# Patient Record
Sex: MALE | Race: WHITE | Employment: STUDENT | URBAN - METROPOLITAN AREA
[De-identification: names, ages, dates, MRNs, and addresses within clinical notes are randomized per-mention and may not be internally consistent; named-entity substitution may affect disease eponyms.]

---

## 2019-07-16 ENCOUNTER — HOSPITAL ENCOUNTER (OUTPATIENT)
Dept: PHYSICAL THERAPY | Age: 19
Discharge: HOME | End: 2019-07-16
Payer: COMMERCIAL

## 2019-07-16 PROCEDURE — 97110 THERAPEUTIC EXERCISES: CPT | Performed by: PHYSICAL THERAPIST

## 2019-07-16 PROCEDURE — 97162 PT EVAL MOD COMPLEX 30 MIN: CPT | Performed by: PHYSICAL THERAPIST

## 2019-07-16 NOTE — PROGRESS NOTES
[facility]  [facility] 2000  Primary: Greg Andre Rpn  Secondary: Herbert Lees [employer] Commercial [id number] [facility] @ [address]  [address] [place] [address]  [phone]   [phone]      OUTPATIENT PHYSICAL THERAPY: Daily Treatment Note  2019          ICD-10: Treatment Diagnosis: Stiffness of right hip, not elsewhere classified (M25.651)  Pain in right hip (M25.551)    TREATMENT PLAN:  Effective Dates: 2019 TO 2019 (60 days). Frequency/Duration: 2-3 times a week for 60 Days    GOALS: (Goals have been discussed and agreed upon with patient.)  Short-Term Functional Goals: Time Frame: 3 weeks  1. Pt will demonstratel PROM with R hip without c/o > 80-90% of L hip ROM. 2. Pt will be able to single leg stance RLE >30 seconds without significant unsteadiness or sway demonstrating improving balance. 3. Pt will be able to demonstrate good hip stability during single leg bridge x10 reps (no hip drop noted) noting improving functional strength. Discharge Goals: Time Frame: 6 weeks  1. Pt will report no c/o pain with his R hip with ADLs and ambulating around campus to/from his classes. 2. Pt will demonstrate full R hip ROM without c/o.  3. Pt will be able to perform 15 minutes of pool jogging without c/o to prepare pt to begin land jogging progression. 4. Pt will be able to demonstrate one minute (heel taps) without compensation BLE demonstrating sufficient R LE strength to begin jogging progression.        _________________________________________________________________________  Pre-treatment Symptoms/Complaints:  R hip stiffness and limited activity level due to post-surgical hip labral repair   Pain: Initial: 0/10 R hip stiffness   Post Session:  0/10   Medications Last Reviewed:  2019  Updated Objective Findings:  See evaluation note from today     TREATMENT:   Therapeutic Exercise: (SEE MINUTES BELOW):  Exercises per grid below to improve mobility, strength and balance. Required minimal visual, verbal and tactile cues to promote proper body alignment. Progressed resistance and range as indicated. Aquatic Therapy (SEE MINUTES BELOW): Aquatic treatment performed per flow grid for Decreased muscle strength, Decreased static/dynamic balance and reactive control, Decreased range of motion, Decreased activity endurance and Low impact and reduced weight bearing activity. Cues provided for technique. Manual Therapy (SEE MINUTES BELOW):  See below to increase R hip mobility  Modalities (SEE MINUTES BELOW): see below to decrease inflammation       Date: 7/16/19  Eval       Modalities:                                Manual Therapy:                                                        Aquatics Activities: next       GAIT (F/B/S/M)        SLR gait        Hamstring Curl gait         Rockettes        Toe/ Heel Raises        Squats        Hip 4 way        Lunges        Clamshells        Hip Circles        Deep well:  Bicycling        Deep well: Jumping jacks        Deep well: Scissoring        Hamstring stretch        Piriformis stretch                                Therapeutic Exercises: 10 minutes       Swiss ball Bridging 2x15       Quadruped hip extension x20        clamshells 2x15                               Therapeutic Activities:                                  HEP: Pt has a copy of protocol exercises and added clamshells to HEP today. Also instructed pt to begin elliptical for 5 minutes without any resistance    [employer] Portal  Treatment/Session Summary:    · Response to Treatment:  Pt needed mild cues to prevent increased lumbar lordosis with hip extension in quadruped. Pt has been compliant with current HEP once daily. Pt will begin aquatics next session and plan to progress protocol as tolerates.   · Communication/Consultation:  None today  · Equipment provided today:  None today  · Recommendations/Intent for next treatment session: Next visit will focus on initiating aquatic exercises. Plan to progress per protocol as tolerates.     Total Treatment Billable Duration:  25 minutes  PT Patient Time In/Time Out  Time In: 0800  Time Out: 0825  Carlyn Hamlin PT

## 2019-07-16 NOTE — THERAPY EVALUATION
Allen Penn  : 2000 [address] @ [address] [place].  [phone]   [phone]        OUTPATIENT PHYSICAL THERAPY:Initial Assessment 2019        ICD-10: Treatment Diagnosis: Stiffness of right hip, not elsewhere classified (M25.651)  Pain in right hip (M25.551)    Precautions/Allergies:   Patient has no allergy information on record. Fall Risk Score:    Ambulatory/Rehab Services H2 Model Falls Risk Assessment    Risk Factors:       (1)  Gender [Male] Ability to Rise from Chair:       (0)  Ability to rise in a single movement    Falls Prevention Plan:       No modifications necessary   Total: (5 or greater = High Risk): 1     [facility]. All Rights Reserved. [facility] Patent #[id number]. 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 Pt has been 50% weight bearing until last week (at 4 weeks post-op pt discharged crutches). Pt has functional ROM; however, his trunk and hip feel stiff as he has been limited with stretching and activity secondary to post-surgerical precautions. Pt has decreased balance with R single leg stance and also demonstrates decreased R hip strength/stability. Pt will benefit from PT to increase R hip mobility and strength per protocol to prepare pt to return to prior level of function.      PROBLEM LIST (Impacting functional limitations):  1. Decreased Strength  2. Decreased ADL/Functional Activities  3. Decreased Balance  4. Increased Pain  5. Decreased Activity Tolerance  6. Decreased Flexibility/Joint Mobility  7. Decreased [place] with Home Exercise Program INTERVENTIONS PLANNED:  1. Balance Exercise  2. Cold  3. Electrical Stimulation  4. Heat  5. Home Exercise Program (HEP)  6. Manual Therapy  7. Neuromuscular Re-education/Strengthening  8. Range of Motion (ROM)  9. Therapeutic Activites  10. Therapeutic Exercise/Strengthening  11. aquatics    TREATMENT PLAN:  Effective Dates: 7/16/2019 TO 9/14/2019 (60 days). Frequency/Duration: 2-3 times a week for 60 Days    GOALS: (Goals have been discussed and agreed upon with patient.)  Short-Term Functional Goals: Time Frame: 3 weeks  1. Pt will demonstratel PROM with R hip without c/o > 80-90% of L hip ROM. 2. Pt will be able to single leg stance RLE >30 seconds without significant unsteadiness or sway demonstrating improving balance. 3. Pt will be able to demonstrate good hip stability during single leg bridge x10 reps (no hip drop noted) noting improving functional strength. Discharge Goals: Time Frame: 6 weeks  1. Pt will report no c/o pain with his R hip with ADLs and ambulating around campus to/from his classes. 2. Pt will demonstrate full R hip ROM without c/o.  3. Pt will be able to perform 15 minutes of pool jogging without c/o to prepare pt to begin land jogging progression. 4. Pt will be able to demonstrate one minute (heel taps) without compensation BLE demonstrating sufficient R LE strength to begin jogging progression. Rehabilitation Potential For Stated Goals: Excellent  Regarding Dagoberto Maharaj's therapy, I certify that the treatment plan above will be carried out by a therapist or under their direction.   Thank you for this referral,  Jazmin Leblanc, PT       Referring Physician Signature: Maegan Hinojosa MD              Date HISTORY:   History of Present Injury/Illness (Reason for Referral):  Pt reports initially feeling R hip pain while weight lifting, performing squats in Dec 2018. 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 Pt reports continued R hip pain and eventually had an MRI earlier this year revealing labral tear. Pt underwent conservative treatment but continued to have chronic R hip pain when injections wore off. Pt had hip labral repair 6/12/19. Pt has been in PT prior to returning to summer school session at [employer] and is continuing PT transitioning from week 4 to 5 in the protocol. Pt has been biking to/from his classes around campus. Pt will be transferring to [employer]   Past Medical History/Comorbidities:   Mr. Jossy Rhoades  has no past medical history on file. Mr. Jossy Rhoades  has no past surgical history on file. 3 prior R knee surgeries (ACL and meniscus)  Social History/Living Environment:     lives on [employer] [place]  Prior Level of Function/Work/Activity:  Independent; [employer] lacrosse athlete  Current Medications:  No current outpatient medications on file. Date Last Reviewed:  7/16/2019   # of Personal Factors/Comorbidities that affect the Plan of Care: 1-2: MODERATE COMPLEXITY   EXAMINATION:   Observation/Orthostatic Postural Assessment:          Increase lumbar lordosis   Palpation:          No specific tenderness   ROM:    Hip AROM flexion past 100 °begins feeling some internal stiffness;  Extension in sidelying at least 10 ° without c/o and ER and IR no c/o but did not fully test ROM; all other LEs WNL but noted bilateral hamstring tightness    SLR L 50 °  R 55 °     L hip and LEs WNL     Strength:     Date:  7/16/19 Date:   Date:     LE MMT Left Right Left Right Left Right   Hip Flex (L1/L2) 5/5 4/5       Hip Abd (glut med) 5/5 4+/5 but note hip drop during single leg bridge noting glute weakness with RLE       Hip Ext Quad    ( L3/4) 5/5 5/5       Hamstring 5/5 5/5       Anterior Tib (L4/L5) 5/5 5/5       Gastroc (S1/2)         EHL (L5)                             Special Tests:  Trendelenburg noted minor hip drop during R stance  Neurological Screen:        Sensation: intact and denies numbness/tingling  Functional Mobility:         Gait/Ambulation:  Independent WNL        Transfers:  independent        Stairs:  Reports independent   Balance:          SLS very unsteady with RLE; LLE >30 seconds and steady with SLS   Body Structures Involved:  1. Bones  2. Joints  3. Muscles Body Functions Affected:  1. Sensory/Pain  2. Neuromusculoskeletal  3. Movement Related Activities and Participation Affected:  1. Mobility  2. Community, Social and [place]  3. athletics and pt is student athlete   # of elements that affect the Plan of Care: 3: MODERATE COMPLEXITY   CLINICAL PRESENTATION:   Presentation: Stable and uncomplicated: LOW COMPLEXITY   CLINICAL DECISION MAKING:   Tool Used: Lower Extremity Functional Scale (LEFS)  Score:  Initial: 53/80 Most Recent: X/80 (Date: -- )   Interpretation of Score: 20 questions each scored on a 5 point scale with 0 representing \"extreme difficulty or unable to perform\" and 4 representing \"no difficulty\". The lower the score, the greater the functional disability. 80/80 represents no disability. Minimal detectable change is 9 points. Medical Necessity:   · Patient is expected to demonstrate progress in strength, range of motion and balance to return towards prior to activity level. Reason for Services/Other Comments:  · Patient continues to require modification of therapeutic interventions to increase complexity of exercises.    Use of outcome tool(s) and clinical judgement create a POC that gives a: Questionable prediction of patient's progress: MODERATE COMPLEXITY       Total Treatment Duration: (pt had to leave PT early to attend 8:30 class)  PT Patient Time In/Time Out  Time In: 0800  Time Out: 2465     Jazmin Leblanc, PT    .

## 2019-07-18 ENCOUNTER — HOSPITAL ENCOUNTER (OUTPATIENT)
Dept: PHYSICAL THERAPY | Age: 19
Discharge: HOME | End: 2019-07-18
Payer: COMMERCIAL

## 2019-07-18 PROCEDURE — 97110 THERAPEUTIC EXERCISES: CPT

## 2019-07-18 NOTE — PROGRESS NOTES
Mimi Garcia  : 2000  Primary: Arnetha Sprain Rpn  Secondary: Alejandra Mendiola [employer] [address] @ [address] [place].  [phone]   [phone]      OUTPATIENT PHYSICAL THERAPY: Daily Treatment Note  2019          ICD-10: Treatment Diagnosis: Stiffness of right hip, not elsewhere classified (M25.651)  Pain in right hip (M25.551)    TREATMENT PLAN:  Effective Dates: 2019 TO 2019 (60 days). Frequency/Duration: 2-3 times a week for 60 Days    GOALS: (Goals have been discussed and agreed upon with patient.)  Short-Term Functional Goals: Time Frame: 3 weeks  1. Pt will demonstratel PROM with R hip without c/o > 80-90% of L hip ROM. 2. Pt will be able to single leg stance RLE >30 seconds without significant unsteadiness or sway demonstrating improving balance. 3. Pt will be able to demonstrate good hip stability during single leg bridge x10 reps (no hip drop noted) noting improving functional strength. Discharge Goals: Time Frame: 6 weeks  1. Pt will report no c/o pain with his R hip with ADLs and ambulating around campus to/from his classes. 2. Pt will demonstrate full R hip ROM without c/o.  3. Pt will be able to perform 15 minutes of pool jogging without c/o to prepare pt to begin land jogging progression. 4. Pt will be able to demonstrate one minute (heel taps) without compensation BLE demonstrating sufficient R LE strength to begin jogging progression. _________________________________________________________________________  Pre-treatment Symptoms/Complaints:  Pt reports mild R hip stiffness and limited activity level due to post-surgical hip labral repair. He reports compliance HEP, and has a copy of MD protocol.        Pain: Initial: 0/10 R hip stiffness       Post Session:  0/10   Medications Last Reviewed:  2019  Updated Objective Findings:  No changes noted from evaluation on 19     TREATMENT: Therapeutic Exercise: (SEE MINUTES BELOW):  Exercises per grid below to improve mobility, strength and balance. Required minimal visual, verbal and tactile cues to promote proper body alignment. Progressed resistance and range as indicated. Aquatic Therapy (SEE MINUTES BELOW): Aquatic treatment performed per flow grid for Decreased muscle strength, Decreased static/dynamic balance and reactive control, Decreased range of motion, Decreased activity endurance and Low impact and reduced weight bearing activity. Cues provided for technique.      Manual Therapy (SEE MINUTES BELOW):  See below to increase R hip mobility  Modalities (SEE MINUTES BELOW): see below to decrease inflammation       Date: 7/16/19  Eval 7/18/19  Visit 2      Modalities:                                Manual Therapy:  5 mins      Scar massage  5 mins                                              Aquatics Activities: next No aquatics / lightening      GAIT (F/B/S/M)        SLR gait        Hamstring Curl gait         Rockettes        Toe/ Heel Raises        Squats        Hip 4 way        Lunges        Clamshells        Hip Circles        Deep well:  Bicycling        Deep well: Jumping jacks        Deep well: Scissoring        Hamstring stretch        Piriformis stretch                                Therapeutic Exercises: 10 mins 45 mins      Stationary bike  5 min warm up      Swiss ball Bridging 2x15 x10  x10 c hip add ball sqz  x10 c hip abd grn tb      Quadruped hip extension x20        clamshells 2x15 grn tb 2x10 B      Wall squats with swiss ball  2x10      Quadraped: alternating hip extn with shdlr flexion  x10 B      VMO step down  4\" 2x10 B      Bosu step up  2x10 B      Step up with contralateral high knee  8\" 2x10 B      3-way calf raises  x15 ea      slantboard stretch  1 min      LAQ   5# 2x10 B      Supine HSC   Swiss ball 2x10                                              Long axis   2xH 1min      Supine adductor stretch \"butterfly\"                Therapeutic Activities:                                  HEP: Pt has a copy of protocol exercises and added clamshells to HEP today. Also instructed pt to begin elliptical for 5 minutes without any resistance    [employer] Portal  Treatment/Session Summary:    · Response to Treatment:  Pt was instructed with and performed Therapeutic Exercise per flow above. Verbal cuing to above hip pain or discomfort. Pt demonstrates good technique throughout. Weakness noted with R>L quads. · Communication/Consultation:  None today  · Equipment provided today:  None today  Recommendations/Intent for next treatment session:  Plan to alternate days with aquatics and Therapeutic exercise over the next 4 weeks, before patient moves from [place].      Total Treatment Billable Duration:  50minutes  PT Patient Time In/Time Out  Time In: 1600  Time Out: 75 Mera Pate, SIMA

## 2019-07-19 ENCOUNTER — HOSPITAL ENCOUNTER (OUTPATIENT)
Dept: PHYSICAL THERAPY | Age: 19
Discharge: HOME | End: 2019-07-19
Payer: COMMERCIAL

## 2019-07-19 PROCEDURE — 97113 AQUATIC THERAPY/EXERCISES: CPT

## 2019-07-19 NOTE — PROGRESS NOTES
Anne Bonillaamp  : 2000  Primary: Karla Gresham Rpn  Secondary: Columba Byers [employer] [address] [place] [address] @ [place] [address].  [phone]   [phone]      OUTPATIENT PHYSICAL THERAPY: Daily Treatment Note  2019          ICD-10: Treatment Diagnosis: Stiffness of right hip, not elsewhere classified (M25.651)  Pain in right hip (M25.551)    TREATMENT PLAN:  Effective Dates: 2019 TO 2019 (60 days). Frequency/Duration: 2-3 times a week for 60 Days    GOALS: (Goals have been discussed and agreed upon with patient.)  Short-Term Functional Goals: Time Frame: 3 weeks  1. Pt will demonstratel PROM with R hip without c/o > 80-90% of L hip ROM. 2. Pt will be able to single leg stance RLE >30 seconds without significant unsteadiness or sway demonstrating improving balance. 3. Pt will be able to demonstrate good hip stability during single leg bridge x10 reps (no hip drop noted) noting improving functional strength. Discharge Goals: Time Frame: 6 weeks  1. Pt will report no c/o pain with his R hip with ADLs and ambulating around campus to/from his classes. 2. Pt will demonstrate full R hip ROM without c/o.  3. Pt will be able to perform 15 minutes of pool jogging without c/o to prepare pt to begin land jogging progression. 4. Pt will be able to demonstrate one minute (heel taps) without compensation BLE demonstrating sufficient R LE strength to begin jogging progression. _________________________________________________________________________  Pre-treatment Symptoms/Complaints:  Pt reports mild R hip stiffness. He continues to limit activity level due to post-surgical hip labral repair. He reports compliance HEP, and has a copy of MD protocol.        Pain: Initial: 0/10 R hip pain, very mild stiffness noted       Post Session:  0/10   Medications Last Reviewed:  2019  Updated Objective Findings:  No changes noted from evaluation on 7/16/19     TREATMENT:   Therapeutic Exercise: (SEE MINUTES BELOW):  Exercises per grid below to improve mobility, strength and balance. Required minimal visual, verbal and tactile cues to promote proper body alignment. Progressed resistance and range as indicated. Aquatic Therapy (SEE MINUTES BELOW): Aquatic treatment performed per flow grid for Decreased muscle strength, Decreased static/dynamic balance and reactive control, Decreased range of motion, Decreased activity endurance and Low impact and reduced weight bearing activity. Cues provided for technique.      Manual Therapy (SEE MINUTES BELOW):  See below to increase R hip mobility  Modalities (SEE MINUTES BELOW): see below to decrease inflammation       Date: 7/16/19  Eval 7/18/19  Visit 2 7/19/19  Visit 3     Modalities:                                Manual Therapy:  5 mins      Scar massage  5 mins                                              Aquatics Activities: next No aquatics / lightening  55 mins  2.5#     GAIT (F/B/S/M)   X6l F/B/S     Side step with squat using dumbbe;lls   x4L     SLR gait        Hamstring Curl gait         Rockettes        Toe/ Heel Raises        Squats        Hip 4 way   x20 B     Lunges        Clamshells        Hip Circles   X10cw/ccw B     SLS with ball toss   1 min R             Core: dumbbells BUE abd/add in squat stance   x20     Core: dumbbells BUE flex/extn in squat stance   x20     Core: paddles BUE horz abd/add in squat stance   x20     Core: dumbbells BUE \"rows\" in squat stance   x20     Core: paddles BUE alternating flex/extn in staggered stance   x20             Plank: alternating hip abd  Holding 2 kickboards   1 min     Plank alternating hip extn   1 min     Flutter kick (lap pool)        Step ups                 Deep well:  Bicycling   5 min pre  5 min post     Deep well: Jumping jacks   1 min     Deep well: Scissoring   1 min     Hamstring stretch        Piriformis stretch        Side lunge stretch                        Therapeutic Exercises: 10 mins 45 mins    mins    Stationary bike  5 min warm up      Swiss ball Bridging 2x15 x10  x10 c hip add ball sqz  x10 c hip abd grn tb      Quadruped hip extension x20        clamshells 2x15 grn tb 2x10 B      Wall squats with swiss ball  2x10      Quadraped: alternating hip extn with shdlr flexion  x10 B      VMO step down  4\" 2x10 B      Bosu step up  2x10 B      Step up with contralateral high knee  8\" 2x10 B      3-way calf raises  x15 ea      slantboard stretch  1 min      LAQ   5# 2x10 B      Supine HSC   Swiss ball 2x10                                              Long axis   2xH 1min      Supine adductor stretch \"butterfly\"                Therapeutic Activities:                                  HEP: Pt has a copy of protocol exercises and added clamshells to HEP today. Also instructed pt to begin elliptical for 5 minutes without any resistance    [facility]  Treatment/Session Summary:    · Response to Treatment:  With today's treatment, pt was instructed with and performed an aquatic therapy program per flow sheet above. Pt utilized approx 50-60% buoyancy, aqua jogging in off load (deep water). Verbal cuing to above hip pain or discomfort. Pt demonstrates good technique throughout. · Communication/Consultation:  None today  · Equipment provided today:  None today  Recommendations/Intent for next treatment session:  Plan to alternate days with aquatics and Therapeutic exercise over the next 4 weeks, before patient moves from [place].      Total Treatment Billable Duration:  55 mins  PT Patient Time In/Time Out  Time In: 1500  Time Out: [address], PTA

## 2019-07-22 ENCOUNTER — HOSPITAL ENCOUNTER (OUTPATIENT)
Dept: PHYSICAL THERAPY | Age: 19
Discharge: HOME | End: 2019-07-22
Payer: COMMERCIAL

## 2019-07-22 PROCEDURE — 97110 THERAPEUTIC EXERCISES: CPT

## 2019-07-22 NOTE — PROGRESS NOTES
Lesa Jacinto  : 2000  Primary: Mone Casas Rpn  Secondary: Quince Ruffing [employer] [address] [place] [address] @ [place] [address].  [phone]   [phone]      OUTPATIENT PHYSICAL THERAPY: Daily Treatment Note  2019          ICD-10: Treatment Diagnosis: Stiffness of right hip, not elsewhere classified (M25.651)  Pain in right hip (M25.551)    TREATMENT PLAN:  Effective Dates: 2019 TO 2019 (60 days). Frequency/Duration: 2-3 times a week for 60 Days    GOALS: (Goals have been discussed and agreed upon with patient.)  Short-Term Functional Goals: Time Frame: 3 weeks  1. Pt will demonstratel PROM with R hip without c/o > 80-90% of L hip ROM. 2. Pt will be able to single leg stance RLE >30 seconds without significant unsteadiness or sway demonstrating improving balance. 3. Pt will be able to demonstrate good hip stability during single leg bridge x10 reps (no hip drop noted) noting improving functional strength. Discharge Goals: Time Frame: 6 weeks  1. Pt will report no c/o pain with his R hip with ADLs and ambulating around campus to/from his classes. 2. Pt will demonstrate full R hip ROM without c/o.  3. Pt will be able to perform 15 minutes of pool jogging without c/o to prepare pt to begin land jogging progression. 4. Pt will be able to demonstrate one minute (heel taps) without compensation BLE demonstrating sufficient R LE strength to begin jogging progression. _________________________________________________________________________  Pre-treatment Symptoms/Complaints:  Pt with no new complaints. Says overall R hip \"feels good\". He notes mild R hip stiffness. He continues to limit activity level due to post-surgical hip labral repair. He reports compliance HEP, and has a copy of MD protocol.        Pain: Initial: 0/10 R hip pain, very mild stiffness noted       Post Session:  0/10   Medications Last Reviewed: 7/22/2019  Updated Objective Findings:  No changes noted from evaluation on 7/16/19     TREATMENT:   Therapeutic Exercise: (SEE MINUTES BELOW):  Exercises per grid below to improve mobility, strength and balance. Required minimal visual, verbal and tactile cues to promote proper body alignment. Progressed resistance and range as indicated. Aquatic Therapy (SEE MINUTES BELOW): Aquatic treatment performed per flow grid for Decreased muscle strength, Decreased static/dynamic balance and reactive control, Decreased range of motion, Decreased activity endurance and Low impact and reduced weight bearing activity. Cues provided for technique.      Manual Therapy (SEE MINUTES BELOW):  See below to increase R hip mobility  Modalities (SEE MINUTES BELOW): see below to decrease inflammation       Date: 7/16/19  Eval 7/18/19  Visit 2 7/19/19  Visit 3 7/22/19  Visit 4    Modalities:                                Manual Therapy:  5 mins      Scar massage  5 mins      Long axis distraction        Inferior glides; supine hip at 90         PROM: 105 deg flex if no pain  20 deg ER if no pain  Week 6 full ROM as tolerated                        Aquatics Activities: next No aquatics / lightening  55 mins  2.5#     GAIT (F/B/S/M)   X6l F/B/S     Side step with squat using dumbbe;lls   x4L     SLR gait        Hamstring Curl gait         Rockettes        Toe/ Heel Raises        Squats        Hip 4 way   x20 B     Lunges        Clamshells        Hip Circles   X10cw/ccw B     SLS with ball toss   1 min R             Core: dumbbells BUE abd/add in squat stance   x20     Core: dumbbells BUE flex/extn in squat stance   x20     Core: paddles BUE horz abd/add in squat stance   x20     Core: dumbbells BUE \"rows\" in squat stance   x20     Core: paddles BUE alternating flex/extn in staggered stance   x20             Plank: alternating hip abd  Holding 2 kickboards   1 min     Plank alternating hip extn   1 min     Flutter kick (lap pool) Step ups                 Deep well:  Bicycling   5 min pre  5 min post     Deep well: Jumping jacks   1 min     Deep well: Scissoring   1 min     Hamstring stretch        Piriformis stretch        Side lunge stretch                        Therapeutic Exercises: 10 mins 45 mins   55 mins    Stationary bike  5 min warm up  Pt on elliptical prior    Swiss ball Bridging 2x15 x10  x10 c hip add ball sqz  x10 c hip abd grn tb  x10  x10 with HSC    Quadruped hip extension x20    x10    clamshells 2x15 grn tb 2x10 B  grn tb 2x10B    Wall squats with swiss ball  2x10  2X10    Quadraped: alternating hip extn with shdlr flexion  x10 B    x10 UE/LE    VMO step down  4\" 2x10 B  Hold 2day    Bosu step up  2x10 B  2x10B    Step up with contralateral high knee  8\" 2x10 B      3-way calf raises  x15 ea  x15 ea    slantboard stretch  1 min   1 min    LAQ   5# 2x10 B  5#2x10B    Side stepping - hip abd    Blue loop    \"monster walk\"    Blue loop    Supine HSC   Swiss ball 2x10  x10    Swiss ball: walk out bridge    X10    Swiss ball: walk out bridge with alternating hip/knee extn    X10  Close SBA    Swiss ball: walk out plank    Can add push up    Swiss ball: supine HSC    x10    Swiss ball: supine hips at 90 deg, holding med ball between ankles BLE IR/ER                                        Long axis   2xH 1min  2x1 min    Supine adductor stretch \"butterfly\"    x10 H10    Gentle inferior glides        Therapeutic Activities:                                  HEP: Pt has a copy of protocol exercises and added clamshells to HEP today. Also instructed pt to begin elliptical for 5 minutes without any resistance    [facility] Portal  Treatment/Session Summary:    · Response to Treatment:  With today's treatment, pt continued with Therapeutic Exercise program, per flow sheet above. Exercises perform for hip and LE strengthening. Verbal cuing provided to avoid painful episodes. Pt demonstrates weakness R quads >R hip mm. · Communication/Consultation:  None today  · Equipment provided today:  None today  Recommendations/Intent for next treatment session:  Plan to alternate days with aquatics and Therapeutic exercise over the next 4 weeks, before patient moves from [place] on Aug 15th    Total Treatment Billable Duration:  55 mins  PT Patient Time In/Time Out  Time In: 1500  Time Out: [address], PTA

## 2019-07-23 ENCOUNTER — HOSPITAL ENCOUNTER (OUTPATIENT)
Dept: PHYSICAL THERAPY | Age: 19
Discharge: HOME | End: 2019-07-23
Payer: COMMERCIAL

## 2019-07-23 PROCEDURE — 97113 AQUATIC THERAPY/EXERCISES: CPT

## 2019-07-23 NOTE — PROGRESS NOTES
Asha Freire  : 2000  Primary: Mali Kasper Rpn  Secondary: Drew Oh [employer] [address] @ [address], [address] [place] [address]  [phone]   [phone]      OUTPATIENT PHYSICAL THERAPY: Daily Treatment Note  2019          ICD-10: Treatment Diagnosis: Stiffness of right hip, not elsewhere classified (M25.651)  Pain in right hip (M25.551)    TREATMENT PLAN:  Effective Dates: 2019 TO 2019 (60 days). Frequency/Duration: 2-3 times a week for 60 Days    GOALS: (Goals have been discussed and agreed upon with patient.)  Short-Term Functional Goals: Time Frame: 3 weeks  1. Pt will demonstratel PROM with R hip without c/o > 80-90% of L hip ROM. 2. Pt will be able to single leg stance RLE >30 seconds without significant unsteadiness or sway demonstrating improving balance. 3. Pt will be able to demonstrate good hip stability during single leg bridge x10 reps (no hip drop noted) noting improving functional strength. Discharge Goals: Time Frame: 6 weeks  1. Pt will report no c/o pain with his R hip with ADLs and ambulating around campus to/from his classes. 2. Pt will demonstrate full R hip ROM without c/o.  3. Pt will be able to perform 15 minutes of pool jogging without c/o to prepare pt to begin land jogging progression. 4. Pt will be able to demonstrate one minute (heel taps) without compensation BLE demonstrating sufficient R LE strength to begin jogging progression. _________________________________________________________________________  Pre-treatment Symptoms/Complaints:  Pt with no new complaints. Says when he lightly stretches into flexion, feels tight for 3-4 reps and then \"lets go\". Primary complaint of mild R hip stiffness. He continues to limit activity level due to post-surgical hip labral repair. He reports compliance HEP, and has a copy of MD protocol.        Pain: Initial: 0/10 R hip pain, very mild stiffness noted. He denies painful episodes       Post Session:  0/10   Medications Last Reviewed:  7/23/2019  Updated Objective Findings:  No changes noted from evaluation on 7/16/19     TREATMENT:   Therapeutic Exercise: (SEE MINUTES BELOW):  Exercises per grid below to improve mobility, strength and balance. Required minimal visual, verbal and tactile cues to promote proper body alignment. Progressed resistance and range as indicated. Aquatic Therapy (SEE MINUTES BELOW): Aquatic treatment performed per flow grid for Decreased muscle strength, Decreased static/dynamic balance and reactive control, Decreased range of motion, Decreased activity endurance and Low impact and reduced weight bearing activity. Cues provided for technique.      Manual Therapy (SEE MINUTES BELOW):  See below to increase R hip mobility  Modalities (SEE MINUTES BELOW): see below to decrease inflammation       Date: 7/16/19  Eval 7/18/19  Visit 2 7/19/19  Visit 3 7/22/19  Visit 4 7/23/19  Visit 5   Modalities:                                Manual Therapy:  5 mins      Scar massage  5 mins      Long axis distraction        Inferior glides; supine hip at 90         PROM: 105 deg flex if no pain  20 deg ER if no pain  Week 6 full ROM as tolerated                        Aquatics Activities: next No aquatics / lightening  55 mins  2.5#  55 mins  3.73#   GAIT (F/B/S/M)   X6l F/B/S  c dumbbells F/B/S   Side step with squat using dumbbe;lls   x4L  x4L   SLR gait        Hamstring Curl gait      x2L for quad/hip flexor stretch   Rockettes        Toe/ Heel Raises        Squats     x20 on bottom step   Hip 4 way   x20 B     Lunges        Clamshells        Hip Circles   X10cw/ccw B  x15 cw/ccw B   Wall sit  (3 ft) with alternating LAQ     X30B   SLS with ball toss   1 min R             Core: dumbbells BUE abd/add in squat stance   x20  X30 purplle db   Core: dumbbells BUE flex/extn in squat stance   x20  x30    Core: paddles BUE horz abd/add in squat stance   x20  x30   Core: dumbbells BUE \"rows\" in squat stance   x20  x30   Core: paddles BUE alternating flex/extn in staggered stance   x20  c dumbbells x15B           Plank: alternating hip abd  Holding 2 kickboards   1 min  x30 B   Plank alternating hip extn   1 min  x30B   Flutter kick (lap pool)     X6L flippers 6 laps   1 lap \"frog kick\"   Step ups                 Deep well:  Bicycling   5 min pre  5 min post  Aqua jog 5 min   Deep well: Jumping jacks   1 min  90 sec   Deep well: Scissoring   1 min  90 sec   Hamstring stretch        Piriformis stretch        Side lunge stretch                        Therapeutic Exercises: 10 mins 45 mins   55 mins    Stationary bike  5 min warm up  Pt on elliptical prior    Swiss ball Bridging 2x15 x10  x10 c hip add ball sqz  x10 c hip abd grn tb  x10  x10 with HSC    Quadruped hip extension x20    x10    clamshells 2x15 grn tb 2x10 B  grn tb 2x10B    Wall squats with swiss ball  2x10  2X10    Quadraped: alternating hip extn with shdlr flexion  x10 B    x10 UE/LE    VMO step down  4\" 2x10 B  Hold 2day    Bosu step up  2x10 B  2x10B    Step up with contralateral high knee  8\" 2x10 B      3-way calf raises  x15 ea  x15 ea    slantboard stretch  1 min   1 min    LAQ   5# 2x10 B  5#2x10B    Side stepping - hip abd    Blue loop    \"monster walk\"    Blue loop    Supine HSC   Swiss ball 2x10  x10    Swiss ball: walk out bridge    X10    Swiss ball: walk out bridge with alternating hip/knee extn    X10  Close SBA    Swiss ball: walk out plank    Can add push up    Swiss ball: supine HSC    x10    Swiss ball: supine hips at 90 deg, holding med ball between ankles BLE IR/ER         with                                 Long axis   2xH 1min  2x1 min    Supine adductor stretch \"butterfly\"    x10 H10    Gentle inferior glides        Therapeutic Activities:                                  HEP: Pt has a copy of protocol exercises for HEP    [facility] Portal  Treatment/Session Summary: · Response to Treatment:  With today's treatment, pt continued with aquatic exercises per flow sheet above, adding flutter kicks as noted. Pt notes mild tightness with hip extension past neutral, with sensation resolving after several seconds, or repetitions. Exercises perform for hip and LE strengthening and ROM. Verbal cuing provided to avoid painful episodes. Pt demonstrates weakness R quads >R hip mm.      · Communication/Consultation:  None today  · Equipment provided today:  None today  Recommendations/Intent for next treatment session:  Plan to alternate days with aquatics and Therapeutic exercise over the next 4 weeks, before patient moves from [place] on Aug 15th    Total Treatment Billable Duration:  55 mins  PT Patient Time In/Time Out  Time In: 1500  Time Out: [address], PTA

## 2019-07-31 ENCOUNTER — HOSPITAL ENCOUNTER (OUTPATIENT)
Dept: PHYSICAL THERAPY | Age: 19
Discharge: HOME | End: 2019-07-31
Payer: COMMERCIAL

## 2019-07-31 NOTE — PROGRESS NOTES
Patient called to cancel today's physical therapy appointment, due to a conflict with the time. Will continue with POC at next visit.

## 2019-08-05 ENCOUNTER — HOSPITAL ENCOUNTER (OUTPATIENT)
Dept: PHYSICAL THERAPY | Age: 19
Discharge: HOME | End: 2019-08-05
Payer: COMMERCIAL

## 2019-08-05 PROCEDURE — 97113 AQUATIC THERAPY/EXERCISES: CPT

## 2019-08-05 NOTE — PROGRESS NOTES
Alexsandra Hew  : 2000  Primary: Renu Farmer Rpn  Secondary: Raffy Chavarria [employer] [address] [place] [address] @ [address], [address] [place] [address]  [phone]   [phone]      OUTPATIENT PHYSICAL THERAPY: Daily Treatment Note  2019          ICD-10: Treatment Diagnosis: Stiffness of right hip, not elsewhere classified (M25.651)  Pain in right hip (M25.551)    TREATMENT PLAN:  Effective Dates: 2019 TO 2019 (60 days). Frequency/Duration: 2-3 times a week for 60 Days    GOALS: (Goals have been discussed and agreed upon with patient.)  Short-Term Functional Goals: Time Frame: 3 weeks  1. Pt will demonstratel PROM with R hip without c/o > 80-90% of L hip ROM. 2. Pt will be able to single leg stance RLE >30 seconds without significant unsteadiness or sway demonstrating improving balance. 3. Pt will be able to demonstrate good hip stability during single leg bridge x10 reps (no hip drop noted) noting improving functional strength. Discharge Goals: Time Frame: 6 weeks  1. Pt will report no c/o pain with his R hip with ADLs and ambulating around campus to/from his classes. 2. Pt will demonstrate full R hip ROM without c/o.  3. Pt will be able to perform 15 minutes of pool jogging without c/o to prepare pt to begin land jogging progression. 4. Pt will be able to demonstrate one minute (heel taps) without compensation BLE demonstrating sufficient R LE strength to begin jogging progression. _________________________________________________________________________  Pre-treatment Symptoms/Complaints:  Pt with no new complaints. Says when he lightly stretches into flexion, feels tight for 3-4 reps and then \"lets go\". Primary complaint of mild R hip stiffness. He continues to limit activity level due to post-surgical hip labral repair. He reports compliance HEP, and has a copy of MD protocol.        Pain: Initial: 0/10   Pt reports no pain but states he is having R hip flexor tightness       Post Session:  0/10   Medications Last Reviewed:  8/5/2019  Updated Objective Findings:  No changes noted from evaluation on 7/16/19     TREATMENT:   Therapeutic Exercise: (SEE MINUTES BELOW):  Exercises per grid below to improve mobility, strength and balance. Required minimal visual, verbal and tactile cues to promote proper body alignment. Progressed resistance and range as indicated. Aquatic Therapy (SEE MINUTES BELOW): Aquatic treatment performed per flow grid for Decreased muscle strength, Decreased static/dynamic balance and reactive control, Decreased range of motion, Decreased activity endurance and Low impact and reduced weight bearing activity. Cues provided for technique. Manual Therapy (SEE MINUTES BELOW):  See below to increase R hip mobility  Modalities (SEE MINUTES BELOW): see below to decrease inflammation       Date: 7/16/19  Eval 7/18/19  Visit 2 7/19/19  Visit 3 7/22/19  Visit 4 7/23/19  Visit 5 8/5/19   visit 6   Modalities:                                    Manual Therapy:  5 mins       Scar massage  5 mins       Long axis distraction         Inferior glides; supine hip at 90          PROM: 105 deg flex if no pain  20 deg ER if no pain  Week 6 full ROM as tolerated                           Aquatics Activities: next No aquatics / lightening  55 mins  2.5#  55 mins  3.73# 33. 75# 45 min   GAIT (F/B/S/M)   X6l F/B/S  c dumbbells F/B/S x4L DB   Side step with squat using dumbbe;lls   x4L  x4L x4L DB   SLR gait         Hamstring Curl gait      x2L for quad/hip flexor stretch    Rockettes         Toe/ Heel Raises         Squats     x20 on bottom step    Hip 4 way   x20 B   x10 slow x10 fast   Lunges         Clamshells         Hip Circles   X10cw/ccw B  x15 cw/ccw B    Wall sit  (3 ft) with alternating LAQ     X30B    SLS with ball toss   1 min R      sportscord      Lunges F/S x15 B   Core: dumbbells BUE abd/add in squat stance   x20  X30 purplle db    Core: dumbbells BUE flex/extn in squat stance   x20  x30     Core: paddles BUE horz abd/add in squat stance   x20  x30    Core: dumbbells BUE \"rows\" in squat stance   x20  x30    Core: paddles BUE alternating flex/extn in staggered stance   x20  c dumbbells x15B             Plank: alternating hip abd  Holding 2 kickboards   1 min  x30 B    Plank alternating hip extn   1 min  x30B    Flutter kick (lap pool)     X6L flippers 6 laps   1 lap \"frog kick\" x6L flippers, 1 lap frog kick   Step ups                   Deep well:  Bicycling   5 min pre  5 min post  Aqua jog 5 min 6 min interval sprints (1 lap sprint, 1 lap recovery)   Deep well: Jumping jacks   1 min  90 sec    Deep well: Scissoring   1 min  90 sec 2 min   Hamstring stretch         Piriformis stretch         Side lunge stretch         Plank c hip extension      2x15 B            Therapeutic Exercises: 10 mins 45 mins   55 mins     Stationary bike  5 min warm up  Pt on elliptical prior     Swiss ball Bridging 2x15 x10  x10 c hip add ball sqz  x10 c hip abd grn tb  x10  x10 with HSC     Quadruped hip extension x20    x10     clamshells 2x15 grn tb 2x10 B  grn tb 2x10B     Wall squats with swiss ball  2x10  2X10     Quadraped: alternating hip extn with shdlr flexion  x10 B    x10 UE/LE     VMO step down  4\" 2x10 B  Hold 2day     Bosu step up  2x10 B  2x10B     Step up with contralateral high knee  8\" 2x10 B       3-way calf raises  x15 ea  x15 ea     slantboard stretch  1 min   1 min     LAQ   5# 2x10 B  5#2x10B     Side stepping - hip abd    Blue loop     \"monster walk\"    Blue loop     Supine HSC   Swiss ball 2x10  x10     Swiss ball: walk out bridge    X10     Swiss ball: walk out bridge with alternating hip/knee extn    X10  Close SBA     Swiss ball: walk out plank    Can add push up     Swiss ball: supine HSC    x10     Swiss ball: supine hips at 90 deg, holding med ball between ankles BLE IR/ER          with Long axis   2xH 1min  2x1 min     Supine adductor stretch \"butterfly\"    x10 H10     Gentle inferior glides         Therapeutic Activities:                                      HEP: Pt has a copy of protocol exercises for HEP    [facility] Portal  Treatment/Session Summary:    Response to Treatment:  Pt requested aquatics today as he feels he is more challenged. Continued as seen above adding sports cord exercises. Pt demonstrates decreased balance/stability with higher level resistance. Decreased endurance with deep well intervals. Pt denies any painful episodes during treatment. Pt approached therapy with decreased motivation today and appeared apathetic.   · Communication/Consultation:  None today  · Equipment provided today:  None today  Recommendations/Intent for next treatment session:  Plan to alternate days with aquatics and Therapeutic exercise over the next 4 weeks, before patient moves from [place] on Aug 15th    Total Treatment Billable Duration:  45 mins  PT Patient Time In/Time Out  Time In: 1450  Time Out: Darvin Benedict PTA

## 2019-08-06 ENCOUNTER — HOSPITAL ENCOUNTER (OUTPATIENT)
Dept: PHYSICAL THERAPY | Age: 19
Discharge: HOME | End: 2019-08-06
Payer: COMMERCIAL

## 2019-08-06 PROCEDURE — 97110 THERAPEUTIC EXERCISES: CPT | Performed by: PHYSICAL THERAPIST

## 2019-08-06 PROCEDURE — 97140 MANUAL THERAPY 1/> REGIONS: CPT | Performed by: PHYSICAL THERAPIST

## 2019-08-06 NOTE — PROGRESS NOTES
Go Kerr  : 2000  Primary: Carmen Howard Rpn  Secondary: [employer] [address] @ [place] [address].  [phone]   [phone]      OUTPATIENT PHYSICAL THERAPY: Daily Treatment Note/Progress Note  2019          ICD-10: Treatment Diagnosis: Stiffness of right hip, not elsewhere classified (M25.651)  Pain in right hip (M25.551)    TREATMENT PLAN:  Effective Dates: 2019 TO 2019 (60 days). Frequency/Duration: 2-3 times a week for 60 Days    GOALS: (Goals have been discussed and agreed upon with patient.)  Short-Term Functional Goals: Time Frame: 3 weeks  1. Pt will demonstratel PROM with R hip without c/o > 80-90% of L hip ROM.  (MET 19)  2. Pt will be able to single leg stance RLE >30 seconds without significant unsteadiness or sway demonstrating improving balance. (MET 19)  3. Pt will be able to demonstrate good hip stability during single leg bridge x10 reps (no hip drop noted) noting improving functional strength. (MET 19)  Discharge Goals: Time Frame: 6 weeks  1. Pt will report no c/o pain with his R hip with ADLs and ambulating around campus to/from his classes. (MET 19)  2. Pt will demonstrate full R hip ROM without c/o. (progressing 19)  3. Pt will be able to perform 15 minutes of pool jogging without c/o to prepare pt to begin land jogging progression. (progressing 19)  4. Pt will be able to demonstrate one minute (heel taps) without compensation BLE demonstrating sufficient R LE strength to begin jogging progression.  (progressing 19)      _________________________________________________________________________  Pre-treatment Symptoms/Complaints:  No c/o pain; only intermittent hip tightness/stiffness      Pain: Initial: 0/10   Pt reports no pain but states he has intermittent tightness that let's him know \"I did have surgery. \"       Post Session:  010   Medications Last Reviewed:  8/6/2019  Updated Objective Findings:      ROM:    At Eval: Hip AROM flexion past 100 °begins feeling some internal stiffness; Extension in sidelying at least 10 ° without c/o and ER and IR no c/o but did not fully test ROM; all other LEs WNL but noted bilateral hamstring tightness     SLR L 50 °  R 55 °    8/6/19:  Right hip flexion 128 °  L 130 °   Hip ER: R 48 °  L 42 °   Hip IR WNL   Hip Extension WNL     SLR  B 75 °       L hip and LEs WNL      Strength:                   Date:  7/16/19 Date:   8/6/19 Date:       LE MMT Left Right Left Right Left Right   Hip Flex (L1/L2) 5/5 4/5    4+/5       Hip Abd (glut med) 5/5 4+/5 but note hip drop during single leg bridge noting glute weakness with RLE    5/5 no hip drop noted with single leg bridge with 15 reps       Hip Ext       5/5       Quad    ( L3/4) 5/5 5/5           Hamstring 5/5 5/5           Anterior Tib (L4/L5) 5/5 5/5           Gastroc (S1/2)               EHL (L5)                                                  Single Leg VMO step-downs (heel taps): Only performed 2x10 reps today and note decreased stability RLE compared to L    Balance: single leg stance >30 seconds on floor; good stability; decreased stability/proprioception noted with SLS on airex and ball toss      CLINICAL DECISION MAKING:   Tool Used: Lower Extremity Functional Scale (LEFS)  Score:  Initial: 53/80 Most Recent: --/80 (Date: 8/6/19 )   Interpretation of Score: 20 questions each scored on a 5 point scale with 0 representing \"extreme difficulty or unable to perform\" and 4 representing \"no difficulty\". The lower the score, the greater the functional disability. 80/80 represents no disability. Minimal detectable change is 9 points. TREATMENT:   Therapeutic Exercise: (SEE MINUTES BELOW):  Exercises per grid below to improve mobility, strength and balance. Required minimal visual, verbal and tactile cues to promote proper body alignment.   Progressed resistance and range as indicated. Aquatic Therapy (SEE MINUTES BELOW): Aquatic treatment performed per flow grid for Decreased muscle strength, Decreased static/dynamic balance and reactive control, Decreased range of motion, Decreased activity endurance and Low impact and reduced weight bearing activity. Cues provided for technique. Manual Therapy (SEE MINUTES BELOW):  See below to increase R hip mobility  Modalities (SEE MINUTES BELOW): see below to decrease inflammation       Date: 7/16/19  Eval 7/18/19  Visit 2 7/19/19  Visit 3 7/22/19  Visit 4 7/23/19  Visit 5 8/5/19   visit 6 8/6/19  Visit 7  Progress Note   Modalities:                                        Manual Therapy:  5 mins     10 minutes   Scar massage  5 mins     With roller massage glutes, TFL/ITB, quad, manual hip flexor    Long axis distraction       3 x 10 sec   Inferior glides; supine hip at 90           PROM: 105 deg flex if no pain  20 deg ER if no pain  Week 6 full ROM as tolerated                              Aquatics Activities: next No aquatics / lightening  55 mins  2.5#  55 mins  3.73# 33. 75# 45 min    GAIT (F/B/S/M)   X6l F/B/S  c dumbbells F/B/S x4L DB    Side step with squat using dumbbe;lls   x4L  x4L x4L DB    SLR gait          Hamstring Curl gait      x2L for quad/hip flexor stretch     Rockettes          Toe/ Heel Raises          Squats     x20 on bottom step     Hip 4 way   x20 B   x10 slow x10 fast    Lunges          Clamshells          Hip Circles   X10cw/ccw B  x15 cw/ccw B     Wall sit  (3 ft) with alternating LAQ     X30B     SLS with ball toss   1 min R       sportscord      Lunges F/S x15 B    Core: dumbbells BUE abd/add in squat stance   x20  X30 purplle db     Core: dumbbells BUE flex/extn in squat stance   x20  x30      Core: paddles BUE horz abd/add in squat stance   x20  x30     Core: dumbbells BUE \"rows\" in squat stance   x20  x30     Core: paddles BUE alternating flex/extn in staggered stance   x20  c dumbbells x15B Plank: alternating hip abd  Holding 2 kickboards   1 min  x30 B     Plank alternating hip extn   1 min  x30B     Flutter kick (lap pool)     X6L flippers 6 laps   1 lap \"frog kick\" x6L flippers, 1 lap frog kick    Step ups                     Deep well:  Bicycling   5 min pre  5 min post  Aqua jog 5 min 6 min interval sprints (1 lap sprint, 1 lap recovery)    Deep well: Jumping jacks   1 min  90 sec     Deep well: Scissoring   1 min  90 sec 2 min    Hamstring stretch          Piriformis stretch          Side lunge stretch          Plank c hip extension      2x15 B              Therapeutic Exercises: 10 mins 45 mins   55 mins   60 minutes   Stationary bike  5 min warm up  Pt on elliptical prior   8 minutes (and elliptical 10 min prior)   Swiss ball Bridging 2x15 x10  x10 c hip add ball sqz  x10 c hip abd grn tb  x10  x10 with HSC      Quadruped hip extension x20    x10      clamshells 2x15 grn tb 2x10 B  grn tb 2x10B   Blue 2x15 BLE   Wall squats with swiss ball  2x10  2X10   2x15 BLE  x5 LLE with 10# med ball with BLE   Quadraped: alternating hip extn with shdlr flexion  x10 B    x10 UE/LE      VMO step down  4\" 2x10 B  Hold 2day   6\" 2x10 BLE   Bosu step up  2x10 B  2x10B   Bosu marches 2 minutes with ball toss   Step up with contralateral high knee  8\" 2x10 B        3-way calf raises  x15 ea  x15 ea      slantboard stretch  1 min   1 min   1 minutes   LAQ   5# 2x10 B  5#2x10B      Side stepping - hip abd    Blue loop   double Blue 6 x 20 ft   \"monster walk\"    Blue loop   Double blue 6 x 20 ft   Supine HSC   Swiss ball 2x10  x10      Swiss ball: walk out bridge    X10      Swiss ball: walk out bridge with alternating hip/knee extn    X10  Close SBA      Swiss ball: walk out plank    Can add push up      Swiss ball: supine HSC    x10   x15   Swiss ball: supine hips at 90 deg, holding med ball between ankles BLE IR/ER           with           Bridge with leg extensions       x10  x15   lunges       2x10 BLE stationary;2x20 ft walking             Long axis   2xH 1min  2x1 min      Supine adductor stretch \"butterfly\"    x10 H10      Gentle inferior glides          Therapeutic Activities:                                          HEP: Pt has a copy of protocol exercises for HEP; updated with new bands 8/6/19    [facility] Portal  Treatment/Session Summary:    Response to Treatment:  Pt is 8 weeks p/o tomorrow (8/7/19). Pt seems to be progressing as expected per protocol or actually ahead of schedule. Pt has good mobility with only mild stiffness end range flexion and ER. Pt has good strength; however, lacks B LE conditioning and also has decreased balance/proprioception RLE compared to L. Updated HEP and plan to review next session. · Communication/Consultation:  None today  · Equipment provided today:  blue CRX loop and blue theraband   Recommendations/Intent for next treatment session:  Plan to perform half aquatics and half land next session. Review updated HEP and then perform aquatics.   Patient moves from [place] on Aug 15th    Total Treatment Billable Duration:   70 mins  PT Patient Time In/Time Out  Time In: 1455  Time Out: 1605  Jazmin Leblanc, PT

## 2019-08-08 ENCOUNTER — HOSPITAL ENCOUNTER (OUTPATIENT)
Dept: PHYSICAL THERAPY | Age: 19
Discharge: HOME | End: 2019-08-08
Payer: COMMERCIAL

## 2019-08-08 PROCEDURE — 97113 AQUATIC THERAPY/EXERCISES: CPT | Performed by: PHYSICAL THERAPIST

## 2019-08-08 NOTE — PROGRESS NOTES
Wendy Malloy  : 2000  Primary: Johnie Benson Rpn  Secondary: [employer] [address] [place] [address] @ [address], [address] [place] [address]  [phone]   [phone]      OUTPATIENT PHYSICAL THERAPY: Daily Treatment Note/Progress Note  2019          ICD-10: Treatment Diagnosis: Stiffness of right hip, not elsewhere classified (M25.651)  Pain in right hip (M25.551)    TREATMENT PLAN:  Effective Dates: 2019 TO 2019 (60 days). Frequency/Duration: 2-3 times a week for 60 Days    GOALS: (Goals have been discussed and agreed upon with patient.)  Short-Term Functional Goals: Time Frame: 3 weeks  1. Pt will demonstratel PROM with R hip without c/o > 80-90% of L hip ROM.  (MET 19)  2. Pt will be able to single leg stance RLE >30 seconds without significant unsteadiness or sway demonstrating improving balance. (MET 19)  3. Pt will be able to demonstrate good hip stability during single leg bridge x10 reps (no hip drop noted) noting improving functional strength. (MET 19)  Discharge Goals: Time Frame: 6 weeks  1. Pt will report no c/o pain with his R hip with ADLs and ambulating around campus to/from his classes. (MET 19)  2. Pt will demonstrate full R hip ROM without c/o. (progressing 19)  3. Pt will be able to perform 15 minutes of pool jogging without c/o to prepare pt to begin land jogging progression. (progressing 19)  4. Pt will be able to demonstrate one minute (heel taps) without compensation BLE demonstrating sufficient R LE strength to begin jogging progression.  (progressing 19)      _________________________________________________________________________  Pre-treatment Symptoms/Complaints:  No c/o pain; pt reports muscle soreness posterior hips (proximal Hamstring area)      Pain: Initial: 0/10   \"I did my exercises and 30 minutes on the elliptical yesterday and I feel good, but I'm sore. \"     Post Session:  0/10 Medications Last Reviewed:  8/8/2019  Updated Objective Findings:      ROM:    At Eval: Hip AROM flexion past 100 °begins feeling some internal stiffness; Extension in sidelying at least 10 ° without c/o and ER and IR no c/o but did not fully test ROM; all other LEs WNL but noted bilateral hamstring tightness     SLR L 50 °  R 55 °    8/6/19:  Right hip flexion 128 °  L 130 °   Hip ER: R 48 °  L 42 °   Hip IR WNL   Hip Extension WNL     SLR  B 75 °       L hip and LEs WNL      Strength:                   Date:  7/16/19 Date:   8/6/19 Date:       LE MMT Left Right Left Right Left Right   Hip Flex (L1/L2) 5/5 4/5    4+/5       Hip Abd (glut med) 5/5 4+/5 but note hip drop during single leg bridge noting glute weakness with RLE    5/5 no hip drop noted with single leg bridge with 15 reps       Hip Ext       5/5       Quad    ( L3/4) 5/5 5/5           Hamstring 5/5 5/5           Anterior Tib (L4/L5) 5/5 5/5           Gastroc (S1/2)               EHL (L5)                                                  Single Leg VMO step-downs (heel taps): Only performed 2x10 reps today and note decreased stability RLE compared to L    Balance: single leg stance >30 seconds on floor; good stability; decreased stability/proprioception noted with SLS on airex and ball toss      CLINICAL DECISION MAKING:   Tool Used: Lower Extremity Functional Scale (LEFS)  Score:  Initial: 53/80 Most Recent: --/80 (Date: 8/6/19 )   Interpretation of Score: 20 questions each scored on a 5 point scale with 0 representing \"extreme difficulty or unable to perform\" and 4 representing \"no difficulty\". The lower the score, the greater the functional disability. 80/80 represents no disability. Minimal detectable change is 9 points. TREATMENT:   Therapeutic Exercise: (SEE MINUTES BELOW):  Exercises per grid below to improve mobility, strength and balance. Required minimal visual, verbal and tactile cues to promote proper body alignment.   Progressed resistance and range as indicated. Aquatic Therapy (SEE MINUTES BELOW): Aquatic treatment performed per flow grid for Decreased muscle strength, Decreased static/dynamic balance and reactive control, Decreased range of motion, Decreased activity endurance and Low impact and reduced weight bearing activity. Cues provided for technique. Manual Therapy (SEE MINUTES BELOW):  See below to increase R hip mobility  Modalities (SEE MINUTES BELOW): see below to decrease inflammation       Date: 7/16/19  Eval 7/18/19  Visit 2 7/19/19  Visit 3 7/22/19  Visit 4 7/23/19  Visit 5 8/5/19   visit 6 8/6/19  Visit 7  Progress Note 8/8/19  Visit 8   Modalities:                                            Manual Therapy:  5 mins     10 minutes 5 minutes   Scar massage  5 mins     With roller massage glutes, TFL/ITB, quad, manual hip flexor  Roller massage piriformis/glutes/hamstring   Long axis distraction       3 x 10 sec    Inferior glides; supine hip at 90            PROM: 105 deg flex if no pain  20 deg ER if no pain  Week 6 full ROM as tolerated                                 Aquatics Activities: next No aquatics / lightening  55 mins  2.5#  55 mins  3.73# 33. 75# 45 min  3.75#/60 min   GAIT (F/B/S/M)   X6l F/B/S  c dumbbells F/B/S x4L DB  x4L DB F  x2L all others lap pool   Side step with squat using dumbbe;lls   x4L  x4L x4L DB  x2L lap pool DB   SLR gait           Hamstring Curl gait      x2L for quad/hip flexor stretch      Rockettes           Toe/ Heel Raises           Squats     x20 on bottom step      Hip 4 way   x20 B   x10 slow x10 fast  x15 slow  x15 pulse   Lunges           Clamshells           Hip Circles   X10cw/ccw B  x15 cw/ccw B      Wall sit  (3 ft) with alternating LAQ     X30B      SLS with ball toss   1 min R     With sport cord  3 H 30 each   sportscord      Lunges F/S x15 B  Lunges F/S/B x20 each;  F/B/S fast/slow gait 1 minute each direction   Core: dumbbells BUE abd/add in squat stance   x20  X30 purplle db      Core: dumbbells BUE flex/extn in squat stance   x20  x30       Core: paddles BUE horz abd/add in squat stance   x20  x30      Core: dumbbells BUE \"rows\" in squat stance   x20  x30      Core: paddles BUE alternating flex/extn in staggered stance   x20  c dumbbells x15B                 Plank: alternating hip abd  Holding 2 kickboards   1 min  x30 B      Plank alternating hip extn   1 min  x30B      Flutter kick (lap pool)     X6L flippers 6 laps   1 lap \"frog kick\" x6L flippers, 1 lap frog kick  x8L flippers and kick board   Step ups                       Deep well:  Bicycling   5 min pre  5 min post  Aqua jog 5 min 6 min interval sprints (1 lap sprint, 1 lap recovery)  8 minute (eas   Deep well: Jumping jacks   1 min  90 sec      Deep well: Scissoring   1 min  90 sec 2 min     Hamstring stretch        3 H 30    Piriformis stretch        2 H 20    Side lunge stretch           Plank c hip extension      2x15 B                Therapeutic Exercises: 10 mins 45 mins   55 mins   60 minutes    Stationary bike  5 min warm up  Pt on elliptical prior   8 minutes (and elliptical 10 min prior)    Swiss ball Bridging 2x15 x10  x10 c hip add ball sqz  x10 c hip abd grn tb  x10  x10 with HSC       Quadruped hip extension x20    x10       clamshells 2x15 grn tb 2x10 B  grn tb 2x10B   Blue 2x15 BLE    Wall squats with swiss ball  2x10  2X10   2x15 BLE  x5 LLE with 10# med ball with BLE    Quadraped: alternating hip extn with shdlr flexion  x10 B    x10 UE/LE       VMO step down  4\" 2x10 B  Hold 2day   6\" 2x10 BLE    Bosu step up  2x10 B  2x10B   Bosu marches 2 minutes with ball toss    Step up with contralateral high knee  8\" 2x10 B         3-way calf raises  x15 ea  x15 ea       slantboard stretch  1 min   1 min   1 minutes    LAQ   5# 2x10 B  5#2x10B       Side stepping - hip abd    Blue loop   double Blue 6 x 20 ft    \"monster walk\"    Blue loop   Double blue 6 x 20 ft    Supine HSC   Swiss ball 2x10  x10 Swiss ball: walk out bridge    X10       Swiss ball: walk out bridge with alternating hip/knee extn    X10  Close SBA       Swiss ball: walk out plank    Can add push up       Swiss ball: supine HSC    x10   x15    Swiss ball: supine hips at 90 deg, holding med ball between ankles BLE IR/ER            with            Bridge with leg extensions       x10  x15    lunges       2x10 BLE stationary;2x20 ft walking               Long axis   2xH 1min  2x1 min       Supine adductor stretch \"butterfly\"    x10 H10       Gentle inferior glides           Therapeutic Activities:                                              HEP: Pt has a copy of protocol exercises for HEP; updated with new bands 8/6/19    [employer] Portal  Treatment/Session Summary:  8 weeks p/o as of 8/7/19  Response to Treatment:  Pt reports muscle soreness in proximal hamstring area but after aquatic work-out pt reports no c/o afterwards. Began challenging pt with change of directions in the pool with sport cord and also challenged hip/core strengthening and stability without c/o and demonstrating good control. Pt has only two sessions next week prior to being transferred to [facility]. Pt will continue PT at that time with another PT facility. Plan to send protocol with pt. · Communication/Consultation:  None today  · Equipment provided today:  None today  Recommendations/Intent for next treatment session:  Plan to perform land exercises advancing per protocol as tolerates, using aquatics alternating with land work-outs.  Patient moves from [place] on Aug 15th    Total Treatment Billable Duration:   65 mins  PT Patient Time In/Time Out  Time In: 1555  Time Out: 1700  Jazmin Leblanc, PT

## 2019-08-12 ENCOUNTER — HOSPITAL ENCOUNTER (OUTPATIENT)
Dept: PHYSICAL THERAPY | Age: 19
Discharge: HOME | End: 2019-08-12
Payer: COMMERCIAL

## 2019-08-12 PROCEDURE — 97113 AQUATIC THERAPY/EXERCISES: CPT

## 2019-08-12 NOTE — PROGRESS NOTES
[facility]  : 2000  Primary: Greg Andre Rpn  Secondary: Herbert Lees [employer] [facility] @ [address] [place] [address].  [phone]   [phone]      OUTPATIENT PHYSICAL THERAPY: Daily Treatment Note/Progress Note  2019          ICD-10: Treatment Diagnosis: Stiffness of right hip, not elsewhere classified (M25.651)  Pain in right hip (M25.551)    TREATMENT PLAN:  Effective Dates: 2019 TO 2019 (60 days). Frequency/Duration: 2-3 times a week for 60 Days    GOALS: (Goals have been discussed and agreed upon with patient.)  Short-Term Functional Goals: Time Frame: 3 weeks  1. Pt will demonstratel PROM with R hip without c/o > 80-90% of L hip ROM.  (MET 19)  2. Pt will be able to single leg stance RLE >30 seconds without significant unsteadiness or sway demonstrating improving balance. (MET 19)  3. Pt will be able to demonstrate good hip stability during single leg bridge x10 reps (no hip drop noted) noting improving functional strength. (MET 19)  Discharge Goals: Time Frame: 6 weeks  1. Pt will report no c/o pain with his R hip with ADLs and ambulating around campus to/from his classes. (MET 19)  2. Pt will demonstrate full R hip ROM without c/o. (progressing 19)  3. Pt will be able to perform 15 minutes of pool jogging without c/o to prepare pt to begin land jogging progression. (progressing 19)  4. Pt will be able to demonstrate one minute (heel taps) without compensation BLE demonstrating sufficient R LE strength to begin jogging progression.  (progressing 19)      _________________________________________________________________________  Pre-treatment Symptoms/Complaints:       Pain: Initial: 0/10   Pt reports no pain and states his R hip tightness is better.      Post Session:  0/10   Medications Last Reviewed:  2019  Updated Objective Findings:      ROM:    At Eval: Hip AROM flexion past 100 °begins feeling some internal stiffness; Extension in sidelying at least 10 ° without c/o and ER and IR no c/o but did not fully test ROM; all other LEs WNL but noted bilateral hamstring tightness     SLR L 50 °  R 55 °    8/6/19:  Right hip flexion 128 °  L 130 °   Hip ER: R 48 °  L 42 °   Hip IR WNL   Hip Extension WNL     SLR  B 75 °       L hip and LEs WNL      Strength:                   Date:  7/16/19 Date:   8/6/19 Date:       LE MMT Left Right Left Right Left Right   Hip Flex (L1/L2) 5/5 4/5    4+/5       Hip Abd (glut med) 5/5 4+/5 but note hip drop during single leg bridge noting glute weakness with RLE    5/5 no hip drop noted with single leg bridge with 15 reps       Hip Ext       5/5       Quad    ( L3/4) 5/5 5/5           Hamstring 5/5 5/5           Anterior Tib (L4/L5) 5/5 5/5           Gastroc (S1/2)               EHL (L5)                                                  Single Leg VMO step-downs (heel taps): Only performed 2x10 reps today and note decreased stability RLE compared to L    Balance: single leg stance >30 seconds on floor; good stability; decreased stability/proprioception noted with SLS on airex and ball toss      CLINICAL DECISION MAKING:   Tool Used: Lower Extremity Functional Scale (LEFS)  Score:  Initial: 53/80 Most Recent: --/80 (Date: 8/6/19 )   Interpretation of Score: 20 questions each scored on a 5 point scale with 0 representing \"extreme difficulty or unable to perform\" and 4 representing \"no difficulty\". The lower the score, the greater the functional disability. 80/80 represents no disability. Minimal detectable change is 9 points. TREATMENT:   Therapeutic Exercise: (SEE MINUTES BELOW):  Exercises per grid below to improve mobility, strength and balance. Required minimal visual, verbal and tactile cues to promote proper body alignment. Progressed resistance and range as indicated.    Aquatic Therapy (SEE MINUTES BELOW): Aquatic treatment performed per flow grid for Decreased muscle strength, Decreased static/dynamic balance and reactive control, Decreased range of motion, Decreased activity endurance and Low impact and reduced weight bearing activity. Cues provided for technique. Manual Therapy (SEE MINUTES BELOW):  See below to increase R hip mobility  Modalities (SEE MINUTES BELOW): see below to decrease inflammation       Date: 7/16/19  Eval 7/18/19  Visit 2 7/19/19  Visit 3 7/22/19  Visit 4 7/23/19  Visit 5 8/5/19   visit 6 8/6/19  Visit 7  Progress Note 8/8/19  Visit 8 8/12/19  Visit 9   Modalities:                                                Manual Therapy:  5 mins     10 minutes 5 minutes    Scar massage  5 mins     With roller massage glutes, TFL/ITB, quad, manual hip flexor  Roller massage piriformis/glutes/hamstring    Long axis distraction       3 x 10 sec     Inferior glides; supine hip at 90             PROM: 105 deg flex if no pain  20 deg ER if no pain  Week 6 full ROM as tolerated                                    Aquatics Activities: next No aquatics / lightening  55 mins  2.5#  55 mins  3.73# 33. 75# 45 min  3.75#/60 min 3.75# 45    GAIT (F/B/S/M)   X6l F/B/S  c dumbbells F/B/S x4L DB  x4L DB F  x2L all others lap pool x6L F, x4L all others DB   Side step with squat using dumbbe;lls   x4L  x4L x4L DB  x2L lap pool DB x4L DB   SLR gait         x4L   Hamstring Curl gait      x2L for quad/hip flexor stretch    x4L   Rockettes            Toe/ Heel Raises            Squats     x20 on bottom step       Hip 4 way   x20 B   x10 slow x10 fast  x15 slow  x15 pulse    Lunges            Clamshells            Hip Circles   X10cw/ccw B  x15 cw/ccw B    x10 cw/cw, x10 fast eggbeaters cw/ccw   Wall sit  (3 ft) with alternating LAQ     X30B       SLS with ball toss   1 min R     With sport cord  3 H 30 each    sportscord      Lunges F/S x15 B  Lunges F/S/B x20 each;  F/B/S fast/slow gait 1 minute each direction Lunges F/S x20 ea   Core: dumbbells BUE abd/add in squat stance   x20  X30 purplle db       Core: dumbbells BUE flex/extn in squat stance   x20  x30        Core: paddles BUE horz abd/add in squat stance   x20  x30       Core: dumbbells BUE \"rows\" in squat stance   x20  x30       Core: paddles BUE alternating flex/extn in staggered stance   x20  c dumbbells x15B                   Plank: alternating hip abd  Holding 2 kickboards   1 min  x30 B       Plank alternating hip extn   1 min  x30B       Flutter kick (lap pool)     X6L flippers 6 laps   1 lap \"frog kick\" x6L flippers, 1 lap frog kick  x8L flippers and kick board x8L flippers c kickboard   Step ups             jogging         X6L, x4l c DB   Deep well:  Bicycling   5 min pre  5 min post  Aqua jog 5 min 6 min interval sprints (1 lap sprint, 1 lap recovery)  8 minute (eas 8 min intervals (1 lap sprint, 1 lap recovery)   Deep well: Jumping jacks   1 min  90 sec       Deep well: Scissoring   1 min  90 sec 2 min      Hamstring stretch        3 H 30     Piriformis stretch        2 H 20     Side lunge stretch            Plank c hip extension      2x15 B                  Therapeutic Exercises: 10 mins 45 mins   55 mins   60 minutes     Stationary bike  5 min warm up  Pt on elliptical prior   8 minutes (and elliptical 10 min prior)     Swiss ball Bridging 2x15 x10  x10 c hip add ball sqz  x10 c hip abd grn tb  x10  x10 with HSC        Quadruped hip extension x20    x10        clamshells 2x15 grn tb 2x10 B  grn tb 2x10B   Blue 2x15 BLE     Wall squats with swiss ball  2x10  2X10   2x15 BLE  x5 LLE with 10# med ball with BLE     Quadraped: alternating hip extn with shdlr flexion  x10 B    x10 UE/LE        VMO step down  4\" 2x10 B  Hold 2day   6\" 2x10 BLE     Bosu step up  2x10 B  2x10B   Bosu marches 2 minutes with ball toss     Step up with contralateral high knee  8\" 2x10 B          3-way calf raises  x15 ea  x15 ea        slantboard stretch  1 min   1 min   1 minutes     LAQ   5# 2x10 B  5#2x10B        Side stepping - hip abd    Blue loop   double Blue 6 x 20 ft     \"monster walk\"    Blue loop   Double blue 6 x 20 ft     Supine HSC   Swiss ball 2x10  x10        Swiss ball: walk out bridge    X10        Swiss ball: walk out bridge with alternating hip/knee extn    X10  Close SBA        Swiss ball: walk out plank    Can add push up        Swiss ball: supine HSC    x10   x15     Swiss ball: supine hips at 90 deg, holding med ball between ankles BLE IR/ER             with             Bridge with leg extensions       x10  x15     lunges       2x10 BLE stationary;2x20 ft walking                 Long axis   2xH 1min  2x1 min        Supine adductor stretch \"butterfly\"    x10 H10        Gentle inferior glides            Therapeutic Activities:                                                  HEP: Pt has a copy of protocol exercises for HEP; updated with new bands 8/6/19    [employer] Portal  Treatment/Session Summary:   Response to Treatment: Continued aquatics as seen above and tolerated well. Decreased hip flexor tightness noted and proprioception is improving. Increased kavon with exercises today. Pt tolerated all well with no c/o pain in R hip. Communication/Consultation:  None today  · Equipment provided today:  None today  Recommendations/Intent for next treatment session:  Plan to perform land exercises advancing per protocol as tolerates, using aquatics alternating with land work-outs.  Patient moves from [place] on Aug 15th    Total Treatment Billable Duration:  45 mins  PT Patient Time In/Time Out  Time In: 1450  Time Out: Darvin Benedict PTA

## 2019-08-13 ENCOUNTER — HOSPITAL ENCOUNTER (OUTPATIENT)
Dept: PHYSICAL THERAPY | Age: 19
Discharge: HOME | End: 2019-08-13
Payer: COMMERCIAL

## 2019-08-13 PROCEDURE — 97110 THERAPEUTIC EXERCISES: CPT | Performed by: PHYSICAL THERAPIST

## 2019-08-13 NOTE — PROGRESS NOTES
Mimi Garcia  : 2000  Primary: Arnetha Sprain Rpn  Secondary: Alejandra Mendiola [employer] Jeni Brooks @ [place] [address].  [phone]   [phone]      OUTPATIENT PHYSICAL THERAPY: Daily Treatment Note  2019          ICD-10: Treatment Diagnosis: Stiffness of right hip, not elsewhere classified (M25.651)  Pain in right hip (M25.551)    TREATMENT PLAN:  Effective Dates: 2019 TO 2019 (60 days). Frequency/Duration: 2-3 times a week for 60 Days    GOALS: (Goals have been discussed and agreed upon with patient.)  Short-Term Functional Goals: Time Frame: 3 weeks  1. Pt will demonstratel PROM with R hip without c/o > 80-90% of L hip ROM.  (MET 19)  2. Pt will be able to single leg stance RLE >30 seconds without significant unsteadiness or sway demonstrating improving balance. (MET 19)  3. Pt will be able to demonstrate good hip stability during single leg bridge x10 reps (no hip drop noted) noting improving functional strength. (MET 19)  Discharge Goals: Time Frame: 6 weeks  1. Pt will report no c/o pain with his R hip with ADLs and ambulating around campus to/from his classes. (MET 19)  2. Pt will demonstrate full R hip ROM without c/o. (MET 19)  3. Pt will be able to perform 15 minutes of pool jogging without c/o to prepare pt to begin land jogging progression. (MET 19)  4.  Pt will be able to demonstrate one minute (heel taps) without compensation BLE demonstrating sufficient R LE strength to begin jogging progression.  (progressing 19--mild decreased proprioception RLE compared to L)      _________________________________________________________________________  Pre-treatment Symptoms/Complaints:       Pain: Initial: 0/10   Pt reports hiking 3 miles on [address]     Post Session:  0/10   Medications Last Reviewed:  2019  Updated Objective Findings:      ROM:    At Eval: Hip AROM flexion past 100 °begins feeling some internal stiffness; Extension in sidelying at least 10 ° without c/o and ER and IR no c/o but did not fully test ROM; all other LEs WNL but noted bilateral hamstring tightness     SLR L 50 °  R 55 °    8/6/19:  Right hip flexion 128 °  L 130 °   Hip ER: R 48 °  L 42 °   Hip IR WNL   Hip Extension WNL     SLR  B 75 °    8/13/19 R hip WNL   R hip flexion 133 °  L 135 °   Hip ER 50 °  L 40 °   LEs all WFL       L hip and LEs WNL      Strength:                   Date:  7/16/19 Date:   8/6/19 Date:   8/13/19    LE MMT Left Right Left Right Left Right   Hip Flex (L1/L2) 5/5 4/5    4+/5    5/5   Hip Abd (glut med) 5/5 4+/5 but note hip drop during single leg bridge noting glute weakness with RLE    5/5 no hip drop noted with single leg bridge with 15 reps    5/5   Hip Ext       5/5    5/5   Quad    ( L3/4) 5/5 5/5        5/5   Hamstring 5/5 5/5           Anterior Tib (L4/L5) 5/5 5/5           Gastroc (S1/2)               EHL (L5)                                                  Single Leg VMO step-downs (heel taps): Only performed 3x15 reps today and note decreased proprioception RLE compared to L    Balance: single leg stance >30 seconds on floor; good stability; decreased stability/proprioception noted with SLS on airex and ball toss      CLINICAL DECISION MAKING:   Tool Used: Lower Extremity Functional Scale (LEFS)  Score:  Initial: 53/80 Most Recent: 52/80 (Date: 8/6/19 ) pt states he has no difficulty with daily activities and only rated 0/10 due to being limited with running, cutting, pivoting due to progression of protocol    Interpretation of Score: 20 questions each scored on a 5 point scale with 0 representing \"extreme difficulty or unable to perform\" and 4 representing \"no difficulty\". The lower the score, the greater the functional disability. 80/80 represents no disability. Minimal detectable change is 9 points.         TREATMENT:   Therapeutic Exercise: (SEE MINUTES BELOW):  Exercises per grid below to improve mobility, strength and balance. Required minimal visual, verbal and tactile cues to promote proper body alignment. Progressed resistance and range as indicated. Aquatic Therapy (SEE MINUTES BELOW): Aquatic treatment performed per flow grid for Decreased muscle strength, Decreased static/dynamic balance and reactive control, Decreased range of motion, Decreased activity endurance and Low impact and reduced weight bearing activity. Cues provided for technique. Manual Therapy (SEE MINUTES BELOW):  See below to increase R hip mobility  Modalities (SEE MINUTES BELOW): see below to decrease inflammation       Date: 7/18/19  Visit 2 7/19/19  Visit 3 7/22/19  Visit 4 7/23/19  Visit 5 8/5/19   visit 6 8/6/19  Visit 7  Progress Note 8/8/19  Visit 8 8/12/19  Visit 9 8/13/19  Visit 10  D/C   Modalities:                                                Manual Therapy: 5 mins     10 minutes 5 minutes     Scar massage 5 mins     With roller massage glutes, TFL/ITB, quad, manual hip flexor  Roller massage piriformis/glutes/hamstring     Long axis distraction      3 x 10 sec      Inferior glides; supine hip at 90             PROM: 105 deg flex if no pain  20 deg ER if no pain  Week 6 full ROM as tolerated                                    Aquatics Activities: No aquatics / lightening  55 mins  2.5#  55 mins  3.73# 33. 75# 45 min  3.75#/60 min 3.75# 45     GAIT (F/B/S/M)  X6l F/B/S  c dumbbells F/B/S x4L DB  x4L DB F  x2L all others lap pool x6L F, x4L all others DB    Side step with squat using dumbbe;lls  x4L  x4L x4L DB  x2L lap pool DB x4L DB    SLR gait        x4L    Hamstring Curl gait     x2L for quad/hip flexor stretch    x4L    Rockettes            Toe/ Heel Raises            Squats    x20 on bottom step        Hip 4 way  x20 B   x10 slow x10 fast  x15 slow  x15 pulse     Lunges            Clamshells            Hip Circles  X10cw/ccw B  x15 cw/ccw B    x10 cw/cw, x10 fast eggbeaters cw/ccw Wall sit  (3 ft) with alternating LAQ    X30B        SLS with ball toss  1 min R     With sport cord  3 H 30 each     sportscord     Lunges F/S x15 B  Lunges F/S/B x20 each;  F/B/S fast/slow gait 1 minute each direction Lunges F/S x20 ea    Core: dumbbells BUE abd/add in squat stance  x20  X30 purplle db        Core: dumbbells BUE flex/extn in squat stance  x20  x30         Core: paddles BUE horz abd/add in squat stance  x20  x30        Core: dumbbells BUE \"rows\" in squat stance  x20  x30        Core: paddles BUE alternating flex/extn in staggered stance  x20  c dumbbells x15B                    Plank: alternating hip abd  Holding 2 kickboards  1 min  x30 B        Plank alternating hip extn  1 min  x30B        Flutter kick (lap pool)    X6L flippers 6 laps   1 lap \"frog kick\" x6L flippers, 1 lap frog kick  x8L flippers and kick board x8L flippers c kickboard    Step ups             jogging        X6L, x4l c DB    Deep well:  Bicycling  5 min pre  5 min post  Aqua jog 5 min 6 min interval sprints (1 lap sprint, 1 lap recovery)  8 minute (eas 8 min intervals (1 lap sprint, 1 lap recovery)    Deep well: Jumping jacks  1 min  90 sec        Deep well: Scissoring  1 min  90 sec 2 min       Hamstring stretch       3 H 30      Piriformis stretch       2 H 20      Side lunge stretch            Plank c hip extension     2x15 B                   Therapeutic Exercises: 45 mins   55 mins   60 minutes   60 min   Stationary bike 5 min warm up  Pt on elliptical prior   8 minutes (and elliptical 10 min prior)   Elliptical prior to for 25 minutes   Irish ball Bridging x10  x10 c hip add ball sqz  x10 c hip abd grn tb  x10  x10 with HSC      With [address] x20   Quadruped hip extension   x10         clamshells grn tb 2x10 B  grn tb 2x10B   Blue 2x15 BLE      Wall squats with swiss ball 2x10  2X10   2x15 BLE  x5 LLE with 10# med ball with BLE   2x15 10#; unilateral 2x10 partial;     Quadraped: alternating hip extn with shdlr flexion x10 B x10 UE/LE         VMO step down 4\" 2x10 B  Hold 2day   6\" 2x10 BLE   6\" 3x15   Bosu step up 2x10 B  2x10B   Bosu marches 2 minutes with ball toss   Marches 2 minutes   Step up with contralateral high knee 8\" 2x10 B           3-way calf raises x15 ea  x15 ea         slantboard stretch 1 min   1 min   1 minutes      LAQ  5# 2x10 B  5#2x10B         Side stepping - hip abd   Blue loop   double Blue 6 x 20 ft   Black 6 x 25 ft   \"monster walk\"   Blue loop   Double blue 6 x 20 ft   Black 6 x 25 ft   Supine HSC  Swiss ball 2x10  x10         Swiss ball: walk out bridge   X10         Swiss ball: walk out bridge with alternating hip/knee extn   X10  Close SBA         Swiss ball: walk out plank   Can add push up         Swiss ball: supine HSC   x10   x15      Swiss ball: supine hips at 90 deg, holding med ball between ankles BLE IR/ER             with             Bridge with leg extensions      x10  x15   3 x 15   lunges      2x10 BLE stationary;2x20 ft walking   6 x 25 ft with trunk rotation 6# ball   Quick step-ups         4\" 2x 30 sec   stretching         Quad, hamstring, piriformis  3 H 30    grapevine         4 x 25 ft   Long axis  2xH 1min  2x1 min         Supine adductor stretch \"butterfly\"   x10 H10         Gentle inferior glides            Therapeutic Activities:                                                  HEP: Pt has a copy of protocol exercises for HEP; updated with new bands 8/6/19    [facility] Portal  Treatment/Session Summary:   Response to Treatment: Pt has much improved strength and mobilty and will be 9 weeks p/o tomorrow. Pt is leaving [facility] and will continue at [address] [place] [address] near [place] where he attending school. Pt's hip ROM is WNL and his strength is near 5/5; however, note mild decreased stability and proprioception with RLE compared to L. Pt will give protocol to his next PT to progress pt to be able to safely progress pt back to prior function with recreational activities.  Pt was prior collegiate athlete. Pt is not having any c/o with daily activities.     Communication/Consultation:  None today  · Equipment provided today:  None today  Recommendations/Intent for next treatment session:  D/C, pt will continue PT near [facility]    Total Treatment Billable Duration:  60 mins  PT Patient Time In/Time Out  Time In: 1500  Time Out: 1600  Jazmin Leblanc, PT

## 2019-08-15 ENCOUNTER — APPOINTMENT (OUTPATIENT)
Dept: PHYSICAL THERAPY | Age: 19
End: 2019-08-15
Payer: COMMERCIAL

## 2019-08-19 ENCOUNTER — APPOINTMENT (OUTPATIENT)
Dept: PHYSICAL THERAPY | Age: 19
End: 2019-08-19
Payer: COMMERCIAL

## 2019-08-20 ENCOUNTER — APPOINTMENT (OUTPATIENT)
Dept: PHYSICAL THERAPY | Age: 19
End: 2019-08-20
Payer: COMMERCIAL

## 2019-08-22 ENCOUNTER — APPOINTMENT (OUTPATIENT)
Dept: PHYSICAL THERAPY | Age: 19
End: 2019-08-22
Payer: COMMERCIAL

## 2019-08-26 ENCOUNTER — APPOINTMENT (OUTPATIENT)
Dept: PHYSICAL THERAPY | Age: 19
End: 2019-08-26
Payer: COMMERCIAL

## 2019-08-27 ENCOUNTER — APPOINTMENT (OUTPATIENT)
Dept: PHYSICAL THERAPY | Age: 19
End: 2019-08-27
Payer: COMMERCIAL

## 2019-08-29 ENCOUNTER — APPOINTMENT (OUTPATIENT)
Dept: PHYSICAL THERAPY | Age: 19
End: 2019-08-29
Payer: COMMERCIAL